# Patient Record
Sex: MALE | Race: WHITE | NOT HISPANIC OR LATINO | Employment: OTHER | URBAN - NONMETROPOLITAN AREA
[De-identification: names, ages, dates, MRNs, and addresses within clinical notes are randomized per-mention and may not be internally consistent; named-entity substitution may affect disease eponyms.]

---

## 2024-01-26 DIAGNOSIS — I10 HYPERTENSION, ESSENTIAL, BENIGN: ICD-10-CM

## 2024-01-29 PROBLEM — R60.0 LOWER EXTREMITY EDEMA: Status: ACTIVE | Noted: 2024-01-29

## 2024-01-29 PROBLEM — G25.0 ESSENTIAL TREMOR: Status: ACTIVE | Noted: 2024-01-29

## 2024-01-29 PROBLEM — R53.83 FATIGUE: Status: ACTIVE | Noted: 2024-01-29

## 2024-01-29 PROBLEM — I10 HYPERTENSION, ESSENTIAL, BENIGN: Status: ACTIVE | Noted: 2024-01-29

## 2024-01-29 PROBLEM — E78.2 MIXED HYPERLIPIDEMIA: Status: ACTIVE | Noted: 2024-01-29

## 2024-01-29 PROBLEM — I25.10 ATHEROSCLEROSIS OF NATIVE CORONARY ARTERY OF NATIVE HEART WITHOUT ANGINA PECTORIS: Status: ACTIVE | Noted: 2024-01-29

## 2024-01-29 PROBLEM — E11.9 DIABETES (MULTI): Status: ACTIVE | Noted: 2024-01-29

## 2024-01-29 PROBLEM — Z98.61 S/P PTCA (PERCUTANEOUS TRANSLUMINAL CORONARY ANGIOPLASTY): Status: ACTIVE | Noted: 2024-01-29

## 2024-01-29 PROBLEM — R94.39 ABNORMAL NUCLEAR STRESS TEST: Status: ACTIVE | Noted: 2024-01-29

## 2024-01-29 RX ORDER — MEMANTINE HYDROCHLORIDE 21 MG/1
1 CAPSULE, EXTENDED RELEASE ORAL DAILY
COMMUNITY

## 2024-01-29 RX ORDER — CLOPIDOGREL BISULFATE 75 MG/1
1 TABLET ORAL DAILY
COMMUNITY

## 2024-01-29 RX ORDER — CARBIDOPA AND LEVODOPA 10; 100 MG/1; MG/1
1 TABLET, ORALLY DISINTEGRATING ORAL 2 TIMES DAILY
COMMUNITY

## 2024-01-29 RX ORDER — ASPIRIN 81 MG/1
1 TABLET ORAL DAILY
COMMUNITY

## 2024-01-29 RX ORDER — ALBUTEROL SULFATE 90 UG/1
2 AEROSOL, METERED RESPIRATORY (INHALATION)
COMMUNITY
End: 2024-04-18 | Stop reason: ALTCHOICE

## 2024-01-29 RX ORDER — NITROGLYCERIN 0.4 MG/1
0.4 TABLET SUBLINGUAL EVERY 5 MIN PRN
COMMUNITY
End: 2024-04-18 | Stop reason: SDUPTHER

## 2024-01-29 RX ORDER — VALSARTAN 160 MG/1
1 TABLET ORAL DAILY
COMMUNITY

## 2024-01-29 RX ORDER — DICYCLOMINE HYDROCHLORIDE 10 MG/1
10 CAPSULE ORAL EVERY 6 HOURS
COMMUNITY

## 2024-01-29 RX ORDER — METOPROLOL SUCCINATE 25 MG/1
1 TABLET, EXTENDED RELEASE ORAL DAILY
COMMUNITY
Start: 2022-09-06

## 2024-01-29 RX ORDER — DARIFENACIN 15 MG/1
1 TABLET, EXTENDED RELEASE ORAL DAILY
COMMUNITY

## 2024-01-29 RX ORDER — GABAPENTIN 300 MG/1
1 CAPSULE ORAL 3 TIMES DAILY
COMMUNITY

## 2024-01-29 RX ORDER — METFORMIN HYDROCHLORIDE 850 MG/1
1 TABLET ORAL
COMMUNITY

## 2024-01-29 RX ORDER — ATORVASTATIN CALCIUM 20 MG/1
1 TABLET, FILM COATED ORAL DAILY
COMMUNITY

## 2024-01-30 RX ORDER — VALSARTAN 160 MG/1
160 TABLET ORAL DAILY
Qty: 90 TABLET | Refills: 3 | Status: SHIPPED | OUTPATIENT
Start: 2024-01-30

## 2024-03-19 ENCOUNTER — APPOINTMENT (OUTPATIENT)
Dept: CARDIOLOGY | Facility: CLINIC | Age: 84
End: 2024-03-19
Payer: MEDICARE

## 2024-04-18 ENCOUNTER — OFFICE VISIT (OUTPATIENT)
Dept: CARDIOLOGY | Facility: CLINIC | Age: 84
End: 2024-04-18
Payer: MEDICARE

## 2024-04-18 VITALS
WEIGHT: 211 LBS | DIASTOLIC BLOOD PRESSURE: 64 MMHG | HEIGHT: 69 IN | SYSTOLIC BLOOD PRESSURE: 116 MMHG | HEART RATE: 52 BPM | BODY MASS INDEX: 31.25 KG/M2

## 2024-04-18 DIAGNOSIS — Z98.61 S/P PTCA (PERCUTANEOUS TRANSLUMINAL CORONARY ANGIOPLASTY): ICD-10-CM

## 2024-04-18 DIAGNOSIS — Z78.9 NEVER SMOKED TOBACCO: ICD-10-CM

## 2024-04-18 DIAGNOSIS — E78.2 MIXED HYPERLIPIDEMIA: ICD-10-CM

## 2024-04-18 DIAGNOSIS — E66.9 CLASS 1 OBESITY WITH BODY MASS INDEX (BMI) OF 31.0 TO 31.9 IN ADULT, UNSPECIFIED OBESITY TYPE, UNSPECIFIED WHETHER SERIOUS COMORBIDITY PRESENT: ICD-10-CM

## 2024-04-18 DIAGNOSIS — E11.9 DIABETES MELLITUS TYPE II, NON INSULIN DEPENDENT (MULTI): ICD-10-CM

## 2024-04-18 DIAGNOSIS — I10 HYPERTENSION, ESSENTIAL, BENIGN: ICD-10-CM

## 2024-04-18 DIAGNOSIS — I25.10 ATHEROSCLEROSIS OF NATIVE CORONARY ARTERY OF NATIVE HEART WITHOUT ANGINA PECTORIS: Primary | ICD-10-CM

## 2024-04-18 PROBLEM — E66.811 CLASS 1 OBESITY WITH BODY MASS INDEX (BMI) OF 31.0 TO 31.9 IN ADULT: Status: ACTIVE | Noted: 2024-04-18

## 2024-04-18 PROCEDURE — 1159F MED LIST DOCD IN RCRD: CPT | Performed by: INTERNAL MEDICINE

## 2024-04-18 PROCEDURE — 1036F TOBACCO NON-USER: CPT | Performed by: INTERNAL MEDICINE

## 2024-04-18 PROCEDURE — 3074F SYST BP LT 130 MM HG: CPT | Performed by: INTERNAL MEDICINE

## 2024-04-18 PROCEDURE — 3078F DIAST BP <80 MM HG: CPT | Performed by: INTERNAL MEDICINE

## 2024-04-18 PROCEDURE — 99214 OFFICE O/P EST MOD 30 MIN: CPT | Performed by: INTERNAL MEDICINE

## 2024-04-18 RX ORDER — GLIMEPIRIDE 1 MG/1
1 TABLET ORAL DAILY
COMMUNITY
Start: 2023-10-20

## 2024-04-18 RX ORDER — POTASSIUM CHLORIDE 750 MG/1
TABLET, FILM COATED, EXTENDED RELEASE ORAL
COMMUNITY
Start: 2023-10-20

## 2024-04-18 RX ORDER — FUROSEMIDE 20 MG/1
20 TABLET ORAL
COMMUNITY
Start: 2023-11-27 | End: 2024-11-26

## 2024-04-18 RX ORDER — NITROGLYCERIN 0.4 MG/1
0.4 TABLET SUBLINGUAL EVERY 5 MIN PRN
Qty: 25 TABLET | Refills: 11 | Status: SHIPPED | OUTPATIENT
Start: 2024-04-18 | End: 2025-04-18

## 2024-04-18 RX ORDER — FAMOTIDINE 40 MG/1
1 TABLET, FILM COATED ORAL
COMMUNITY
Start: 2023-10-20

## 2024-04-18 RX ORDER — TROSPIUM CHLORIDE 20 MG/1
20 TABLET, FILM COATED ORAL 2 TIMES DAILY
COMMUNITY

## 2024-04-18 ASSESSMENT — ENCOUNTER SYMPTOMS
LIGHT-HEADEDNESS: 1
DIZZINESS: 1
HEADACHES: 1
SHORTNESS OF BREATH: 1

## 2024-04-18 NOTE — PROGRESS NOTES
"Fifi Diaz is a 83 y.o. male       Chief Complaint    Follow-up          HPI   Patient is in the office for follow-up accompanied by his daughter.  His wife passed away back in September 2023.  Since his last visit he had an echocardiogram which came back unremarkable.  He denies any angina and has no orthopnea PND or lower extremity edema and no claudications palpitations and no falls.  He has not had any blood work since he was last seen.  His cardiac and pulm examinations were normal.  His medical therapy was reviewed with him and he seem to tolerate his medical therapy without any side effects.    ASSESSMENT AND PLAN:      1. Coronary artery disease, status post single-vessel angioplasty of the LAD in 2017 in December with drug-eluting stent. Cardiac catheterization June 2020 revealed widely patent LAD stent with no other disease noted. He will remain on dual antiplatelet therapy for the time being.   2. Hyperlipidemia, on statin therapy, lipid profile is ordered  3. Hypertension, currently controlled.   4.  Class I obesity, more encouragement for weight loss with the proper measures to be taken was provided to patient.  5. Essential tremor. That is stable. He follows wit neurology  6. Diabetes on metformin, patient follows with PCP. I have no recent A1c readings  7.  Echocardiogram 2022 demonstrated normal ejection fraction with no significant valvular heart disease     Mauri Argueta MD, FACC   Review of Systems   Constitutional: Positive for malaise/fatigue.   Respiratory:  Positive for shortness of breath.    Neurological:  Positive for dizziness, headaches and light-headedness.   All other systems reviewed and are negative.           Vitals:    04/18/24 1127   BP: 116/64   BP Location: Left arm   Patient Position: Sitting   Pulse: 52   Weight: 95.7 kg (211 lb)   Height: 1.753 m (5' 9\")        Objective   Physical Exam  Constitutional:       Appearance: Normal appearance.   HENT:      Nose: " Nose normal.   Neck:      Vascular: No carotid bruit.   Cardiovascular:      Rate and Rhythm: Normal rate.      Pulses: Normal pulses.      Heart sounds: Normal heart sounds.   Pulmonary:      Effort: Pulmonary effort is normal.   Abdominal:      General: Bowel sounds are normal.      Palpations: Abdomen is soft.   Musculoskeletal:         General: Normal range of motion.      Cervical back: Normal range of motion.      Right lower leg: No edema.      Left lower leg: No edema.   Skin:     General: Skin is warm and dry.   Neurological:      General: No focal deficit present.      Mental Status: He is alert.   Psychiatric:         Mood and Affect: Mood normal.         Behavior: Behavior normal.         Thought Content: Thought content normal.         Judgment: Judgment normal.         Allergies  Penicillins     Current Medications    Current Outpatient Medications:     aspirin 81 mg EC tablet, Take 1 tablet (81 mg) by mouth once daily., Disp: , Rfl:     atorvastatin (Lipitor) 20 mg tablet, Take 1 tablet (20 mg) by mouth once daily., Disp: , Rfl:     carbidopa-levodopa (Parcopa)  mg disintegrating tablet, Take 1 tablet by mouth 2 times a day., Disp: , Rfl:     clopidogrel (Plavix) 75 mg tablet, Take 1 tablet (75 mg) by mouth once daily., Disp: , Rfl:     famotidine (Pepcid) 40 mg tablet, Take 1 tablet (40 mg) by mouth once daily in the morning. Take before meals., Disp: , Rfl:     furosemide (Lasix) 20 mg tablet, Take 1 tablet (20 mg) by mouth once daily., Disp: , Rfl:     glimepiride (Amaryl) 1 mg tablet, 1 tablet (1 mg) once daily., Disp: , Rfl:     memantine (Namenda XR) 21 mg capsule,sprinkle,ER 24hr, Take 1 capsule (21 mg) by mouth once daily., Disp: , Rfl:     metFORMIN (Glucophage) 850 mg tablet, Take 1 tablet (850 mg) by mouth 2 times a day with meals., Disp: , Rfl:     metoprolol succinate XL (Toprol-XL) 25 mg 24 hr tablet, Take 1 tablet (25 mg) by mouth once daily., Disp: , Rfl:     potassium chloride  CR 10 mEq ER tablet, TAKE 1 TABLET BY MOUTH TWICE A DAY WITH FOOD FOR 90 DAYS, Disp: , Rfl:     trospium (Sanctura) 20 mg tablet, Take 1 tablet (20 mg) by mouth twice a day., Disp: , Rfl:     valsartan (Diovan) 160 mg tablet, TAKE 1 TABLET BY MOUTH EVERY DAY (Patient taking differently: Take 0.5 tablets (80 mg) by mouth once daily.), Disp: 90 tablet, Rfl: 3    valsartan (Diovan) 160 mg tablet, Take 1 tablet (160 mg) by mouth once daily., Disp: , Rfl:     darifenacin (Enablex) 15 mg 24 hr tablet, Take 1 tablet (15 mg) by mouth once daily., Disp: , Rfl:     dicyclomine (Bentyl) 10 mg capsule, Take 1 capsule (10 mg) by mouth every 6 hours., Disp: , Rfl:     gabapentin (Neurontin) 300 mg capsule, Take 1 capsule (300 mg) by mouth 3 times a day., Disp: , Rfl:     nitroglycerin (Nitrostat) 0.4 mg SL tablet, Place 1 tablet (0.4 mg) under the tongue every 5 minutes if needed for chest pain., Disp: 25 tablet, Rfl: 11                     Assessment/Plan   1. Atherosclerosis of native coronary artery of native heart without angina pectoris  Follow Up In Cardiology    Lipid Panel    Alanine Aminotransferase    Aspartate Aminotransferase    Basic Metabolic Panel    CBC    nitroglycerin (Nitrostat) 0.4 mg SL tablet    Lipid Panel    Alanine Aminotransferase    Aspartate Aminotransferase    Basic Metabolic Panel    CBC      2. S/P PTCA (percutaneous transluminal coronary angioplasty)        3. Hypertension, essential, benign  Basic Metabolic Panel    Basic Metabolic Panel      4. Mixed hyperlipidemia  Lipid Panel    Alanine Aminotransferase    Aspartate Aminotransferase    Lipid Panel    Alanine Aminotransferase    Aspartate Aminotransferase      5. Class 1 obesity with body mass index (BMI) of 31.0 to 31.9 in adult, unspecified obesity type, unspecified whether serious comorbidity present        6. Never smoked tobacco        7. Diabetes mellitus type II, non insulin dependent (Multi)                 Scribe Attestation  By signing  my name below, I, Carol BARR LPN, Scribe   attest that this documentation has been prepared under the direction and in the presence of Mauri Argueta MD.     Provider Attestation - Scribe documentation    All medical record entries made by the Scribe were at my direction and personally dictated by me. I have reviewed the chart and agree that the record accurately reflects my personal performance of the history, physical exam, discussion and plan.

## 2024-04-18 NOTE — PATIENT INSTRUCTIONS
Please bring all medicines, vitamins, and herbal supplements with you when you come to the office.    Prescriptions will not be filled unless you are compliant with your follow up appointments or have a follow up appointment scheduled as per instruction of your physician. Refills should be requested at the time of your visit.     BMI was above normal measurement. Current weight: 95.7 kg (211 lb)  Weight change since last visit (-) denotes wt loss -13 lbs   Weight loss needed to achieve BMI 25: 42.1 Lbs  Weight loss needed to achieve BMI 30: 8.3 Lbs  Provided instructions on dietary changes.      Follow up  Lab work

## 2024-04-18 NOTE — LETTER
April 18, 2024     Serafin Gary MD  [address]  [facility] [address]    Patient: LILLIAM Diaz   YOB: 1940   Date of Visit: 4/18/2024       Dear Dr. Serafin Gary MD:    Thank you for referring LILLIAM Diaz to me for evaluation. Below are my notes for this consultation.  If you have questions, please do not hesitate to call me. I look forward to following your patient along with you.       Sincerely,     Mauri Argueta MD      CC: No Recipients  ______________________________________________________________________________________    Subjective   LILLIAM Diaz is a 83 y.o. male       Chief Complaint    Follow-up          HPI   Patient is in the office for follow-up accompanied by his daughter.  His wife passed away back in September 2023.  Since his last visit he had an echocardiogram which came back unremarkable.  He denies any angina and has no orthopnea PND or lower extremity edema and no claudications palpitations and no falls.  He has not had any blood work since he was last seen.  His cardiac and pulm examinations were normal.  His medical therapy was reviewed with him and he seem to tolerate his medical therapy without any side effects.    ASSESSMENT AND PLAN:      1. Coronary artery disease, status post single-vessel angioplasty of the LAD in 2017 in December with drug-eluting stent. Cardiac catheterization June 2020 revealed widely patent LAD stent with no other disease noted. He will remain on dual antiplatelet therapy for the time being.   2. Hyperlipidemia, on statin therapy, lipid profile is ordered  3. Hypertension, currently controlled.   4.  Class I obesity, more encouragement for weight loss with the proper measures to be taken was provided to patient.  5. Essential tremor. That is stable. He follows wit neurology  6. Diabetes on metformin, patient follows with PCP. I have no recent A1c readings  7.  Echocardiogram 2022 demonstrated normal ejection fraction  "with no significant valvular heart disease     Mauri Argueta MD, [facility]   Review of Systems   Constitutional: Positive for malaise/fatigue.   Respiratory:  Positive for shortness of breath.    Neurological:  Positive for dizziness, headaches and light-headedness.   All other systems reviewed and are negative.           Vitals:    04/18/24 1127   BP: 116/64   BP Location: Left arm   Patient Position: Sitting   Pulse: 52   Weight: 95.7 kg (211 lb)   Height: 1.753 m (5' 9\")        Objective   Physical Exam  Constitutional:       Appearance: Normal appearance.   HENT:      Nose: Nose normal.   Neck:      Vascular: No carotid bruit.   Cardiovascular:      Rate and Rhythm: Normal rate.      Pulses: Normal pulses.      Heart sounds: Normal heart sounds.   Pulmonary:      Effort: Pulmonary effort is normal.   Abdominal:      General: Bowel sounds are normal.      Palpations: Abdomen is soft.   Musculoskeletal:         General: Normal range of motion.      Cervical back: Normal range of motion.      Right lower leg: No edema.      Left lower leg: No edema.   Skin:     General: Skin is warm and dry.   Neurological:      General: No focal deficit present.      Mental Status: He is alert.   Psychiatric:         Mood and Affect: Mood normal.         Behavior: Behavior normal.         Thought Content: Thought content normal.         Judgment: Judgment normal.         Allergies  Penicillins     Current Medications    Current Outpatient Medications:   •  aspirin 81 mg EC tablet, Take 1 tablet (81 mg) by mouth once daily., Disp: , Rfl:   •  atorvastatin (Lipitor) 20 mg tablet, Take 1 tablet (20 mg) by mouth once daily., Disp: , Rfl:   •  carbidopa-levodopa (Parcopa)  mg disintegrating tablet, Take 1 tablet by mouth 2 times a day., Disp: , Rfl:   •  clopidogrel (Plavix) 75 mg tablet, Take 1 tablet (75 mg) by mouth once daily., Disp: , Rfl:   •  famotidine (Pepcid) 40 mg tablet, Take 1 tablet (40 mg) by mouth once daily in " the morning. Take before meals., Disp: , Rfl:   •  furosemide (Lasix) 20 mg tablet, Take 1 tablet (20 mg) by mouth once daily., Disp: , Rfl:   •  glimepiride (Amaryl) 1 mg tablet, 1 tablet (1 mg) once daily., Disp: , Rfl:   •  memantine (Namenda XR) 21 mg capsule,sprinkle,ER 24hr, Take 1 capsule (21 mg) by mouth once daily., Disp: , Rfl:   •  metFORMIN (Glucophage) 850 mg tablet, Take 1 tablet (850 mg) by mouth 2 times a day with meals., Disp: , Rfl:   •  metoprolol succinate XL (Toprol-XL) 25 mg 24 hr tablet, Take 1 tablet (25 mg) by mouth once daily., Disp: , Rfl:   •  potassium chloride CR 10 mEq ER tablet, TAKE 1 TABLET BY MOUTH TWICE A DAY WITH FOOD FOR 90 DAYS, Disp: , Rfl:   •  trospium (Sanctura) 20 mg tablet, Take 1 tablet (20 mg) by mouth twice a day., Disp: , Rfl:   •  valsartan (Diovan) 160 mg tablet, TAKE 1 TABLET BY MOUTH EVERY DAY (Patient taking differently: Take 0.5 tablets (80 mg) by mouth once daily.), Disp: 90 tablet, Rfl: 3  •  valsartan (Diovan) 160 mg tablet, Take 1 tablet (160 mg) by mouth once daily., Disp: , Rfl:   •  darifenacin (Enablex) 15 mg 24 hr tablet, Take 1 tablet (15 mg) by mouth once daily., Disp: , Rfl:   •  dicyclomine (Bentyl) 10 mg capsule, Take 1 capsule (10 mg) by mouth every 6 hours., Disp: , Rfl:   •  gabapentin (Neurontin) 300 mg capsule, Take 1 capsule (300 mg) by mouth 3 times a day., Disp: , Rfl:   •  nitroglycerin (Nitrostat) 0.4 mg SL tablet, Place 1 tablet (0.4 mg) under the tongue every 5 minutes if needed for chest pain., Disp: 25 tablet, Rfl: 11                     Assessment/Plan   1. Atherosclerosis of native coronary artery of native heart without angina pectoris  Follow Up In Cardiology    Lipid Panel    Alanine Aminotransferase    Aspartate Aminotransferase    Basic Metabolic Panel    CBC    nitroglycerin (Nitrostat) 0.4 mg SL tablet    Lipid Panel    Alanine Aminotransferase    Aspartate Aminotransferase    Basic Metabolic Panel    CBC      2. S/P PTCA  (percutaneous transluminal coronary angioplasty)        3. Hypertension, essential, benign  Basic Metabolic Panel    Basic Metabolic Panel      4. Mixed hyperlipidemia  Lipid Panel    Alanine Aminotransferase    Aspartate Aminotransferase    Lipid Panel    Alanine Aminotransferase    Aspartate Aminotransferase      5. Class 1 obesity with body mass index (BMI) of 31.0 to 31.9 in adult, unspecified obesity type, unspecified whether serious comorbidity present        6. Never smoked tobacco        7. Diabetes mellitus type II, non insulin dependent (Multi)                 Scribe Attestation  By signing my name below, Carol VALDERRAMA LPN, Scribe   attest that this documentation has been prepared under the direction and in the presence of Mauri Argueta MD.     Provider Attestation - Scribe documentation    All medical record entries made by the Scribe were at my direction and personally dictated by me. I have reviewed the chart and agree that the record accurately reflects my personal performance of the history, physical exam, discussion and plan.

## 2024-04-19 ENCOUNTER — APPOINTMENT (OUTPATIENT)
Dept: CARDIOLOGY | Facility: CLINIC | Age: 84
End: 2024-04-19
Payer: MEDICARE

## 2024-04-22 LAB
NON-UH HIE ALANINE AMINOTRANSFERASE:CCNC:PT:SER/PLAS:QN:NO ADDITION OF P-5': 5 INT._UNIT/L (ref 6–46)
NON-UH HIE ANION GAP:SCNC:PT:SER/PLAS:QN:: 10 MEQ/L (ref 6–16)
NON-UH HIE ASPARTATE AMINOTRANSFERASE:CCNC:PT:SER/PLAS:QN:: 15 INT._UNIT/L (ref 5–43)
NON-UH HIE CALCIUM:MCNC:PT:SER/PLAS:QN:: 8.9 MG/DL (ref 8.9–11.1)
NON-UH HIE CARBON DIOXIDE:SCNC:PT:SER/PLAS:QN:: 29 MMOL/L (ref 21–31)
NON-UH HIE CHLORIDE:SCNC:PT:SER/PLAS:QN:: 109 MMOL/L (ref 101–111)
NON-UH HIE CHOLESTEROL.IN HDL:MCNC:PT:SER/PLAS:QN:: 44 MG/DL
NON-UH HIE CHOLESTEROL.IN LDL:MCNC:PT:SER/PLAS:QN:: 52 MG/DL
NON-UH HIE CHOLESTEROL.IN VLDL:MCNC:PT:SER/PLAS:QN:CALCULATED: 16 MG/DL (ref 7–40)
NON-UH HIE CHOLESTEROL:MCNC:PT:SER/PLAS:QN:: 112 MG/DL (ref 120–200)
NON-UH HIE CREATININE:MCNC:PT:SER/PLAS:QN:: 1.5 MG/DL (ref 0.5–1.3)
NON-UH HIE EGFR: 45 ML/MIN/1.73 M2
NON-UH HIE ERYTHROCYTE DISTRIBUTION WIDTH:RATIO:PT:RBC:QN:AUTOMATED COUNT: 14.2 % (ref 10.9–14.2)
NON-UH HIE ERYTHROCYTE MEAN CORPUSCULAR HEMOGLOBIN CONCENTRATION:MCNC:PT:RB: 32.7 GM/DL (ref 31.4–36)
NON-UH HIE ERYTHROCYTE MEAN CORPUSCULAR HEMOGLOBIN:ENTMASS:PT:RBC:QN:AUTOMA: 30.6 PG (ref 27–34)
NON-UH HIE ERYTHROCYTE MEAN CORPUSCULAR VOLUME:ENTVOL:PT:RBC:QN:AUTOMATED C: 93.7 FL (ref 80–100)
NON-UH HIE ERYTHROCYTE SIZE:MORPH:PT:BLD:NOM:: NORMAL
NON-UH HIE ERYTHROCYTES:NCNC:PT:BLD:QN:AUTOMATED COUNT: 4.5 E12/L (ref 4.3–5.9)
NON-UH HIE GLUCOSE:MCNC:PT:SER/PLAS:QN:: 102 MG/DL (ref 55–199)
NON-UH HIE HEMATOCRIT:VFR:PT:BLD:QN:AUTOMATED COUNT: 41.7 % (ref 37.7–49)
NON-UH HIE HEMOGLOBIN:MCNC:PT:BLD:QN:: 13.6 GM/DL (ref 13.5–17.5)
NON-UH HIE LEUKOCYTES: 8.2 E9/L (ref 4–11)
NON-UH HIE PLATELET MEAN VOLUME:ENTVOL:PT:BLD:QN:AUTOMATED COUNT: 8.7 FL (ref 6.4–10.8)
NON-UH HIE PLATELET: 204 E9/L (ref 150–500)
NON-UH HIE POTASSIUM:SCNC:PT:SER/PLAS:QN:: 4.4 MMOL/L (ref 3.5–5.3)
NON-UH HIE SODIUM:SCNC:PT:SER/PLAS:QN:: 144 MMOL/L (ref 135–145)
NON-UH HIE TRIGLYCERIDE:MCNC:PT:SER/PLAS:QN:: 80 MG/DL
NON-UH HIE UREA NITROGEN/CREATININE:MRTO:PT:SER/PLAS:QN:: 16 NO UNITS (ref 10–20)
NON-UH HIE UREA NITROGEN:MCNC:PT:SER/PLAS:QN:: 24 MG/DL (ref 5–21)

## 2024-10-29 ENCOUNTER — APPOINTMENT (OUTPATIENT)
Dept: CARDIOLOGY | Facility: CLINIC | Age: 84
End: 2024-10-29
Payer: MEDICARE

## 2024-10-29 VITALS
HEART RATE: 60 BPM | SYSTOLIC BLOOD PRESSURE: 114 MMHG | HEIGHT: 71 IN | WEIGHT: 195 LBS | BODY MASS INDEX: 27.3 KG/M2 | DIASTOLIC BLOOD PRESSURE: 56 MMHG

## 2024-10-29 DIAGNOSIS — I25.10 ATHEROSCLEROSIS OF NATIVE CORONARY ARTERY OF NATIVE HEART WITHOUT ANGINA PECTORIS: Primary | ICD-10-CM

## 2024-10-29 DIAGNOSIS — G20.A1 PARKINSON'S DISEASE WITHOUT FLUCTUATING MANIFESTATIONS, UNSPECIFIED WHETHER DYSKINESIA PRESENT: ICD-10-CM

## 2024-10-29 DIAGNOSIS — E78.2 MIXED HYPERLIPIDEMIA: ICD-10-CM

## 2024-10-29 DIAGNOSIS — Z78.9 NEVER SMOKED TOBACCO: ICD-10-CM

## 2024-10-29 DIAGNOSIS — I10 HYPERTENSION, ESSENTIAL, BENIGN: ICD-10-CM

## 2024-10-29 DIAGNOSIS — Z98.61 S/P PTCA (PERCUTANEOUS TRANSLUMINAL CORONARY ANGIOPLASTY): ICD-10-CM

## 2024-10-29 DIAGNOSIS — N18.32 STAGE 3B CHRONIC KIDNEY DISEASE (MULTI): ICD-10-CM

## 2024-10-29 DIAGNOSIS — E11.21 TYPE 2 DIABETES MELLITUS WITH DIABETIC NEPHROPATHY, UNSPECIFIED WHETHER LONG TERM INSULIN USE: ICD-10-CM

## 2024-10-29 PROCEDURE — 1159F MED LIST DOCD IN RCRD: CPT | Performed by: INTERNAL MEDICINE

## 2024-10-29 PROCEDURE — 3074F SYST BP LT 130 MM HG: CPT | Performed by: INTERNAL MEDICINE

## 2024-10-29 PROCEDURE — 99214 OFFICE O/P EST MOD 30 MIN: CPT | Performed by: INTERNAL MEDICINE

## 2024-10-29 PROCEDURE — 3078F DIAST BP <80 MM HG: CPT | Performed by: INTERNAL MEDICINE

## 2024-10-29 RX ORDER — VALSARTAN 80 MG/1
80 TABLET ORAL DAILY
COMMUNITY

## 2024-10-29 ASSESSMENT — ENCOUNTER SYMPTOMS
DYSPNEA ON EXERTION: 1
DIZZINESS: 1

## 2025-07-10 ENCOUNTER — APPOINTMENT (OUTPATIENT)
Dept: CARDIOLOGY | Facility: CLINIC | Age: 85
End: 2025-07-10
Payer: MEDICARE

## 2025-07-10 VITALS
HEART RATE: 52 BPM | HEIGHT: 71 IN | BODY MASS INDEX: 26.6 KG/M2 | SYSTOLIC BLOOD PRESSURE: 110 MMHG | DIASTOLIC BLOOD PRESSURE: 60 MMHG | WEIGHT: 190 LBS

## 2025-07-10 DIAGNOSIS — I10 HYPERTENSION, ESSENTIAL, BENIGN: ICD-10-CM

## 2025-07-10 DIAGNOSIS — E78.2 MIXED HYPERLIPIDEMIA: ICD-10-CM

## 2025-07-10 DIAGNOSIS — R53.83 FATIGUE, UNSPECIFIED TYPE: ICD-10-CM

## 2025-07-10 DIAGNOSIS — Z78.9 NEVER SMOKED TOBACCO: ICD-10-CM

## 2025-07-10 DIAGNOSIS — E11.21 TYPE 2 DIABETES MELLITUS WITH DIABETIC NEPHROPATHY, UNSPECIFIED WHETHER LONG TERM INSULIN USE: ICD-10-CM

## 2025-07-10 DIAGNOSIS — R06.00 DYSPNEA, UNSPECIFIED TYPE: ICD-10-CM

## 2025-07-10 DIAGNOSIS — I25.10 ATHEROSCLEROSIS OF NATIVE CORONARY ARTERY OF NATIVE HEART WITHOUT ANGINA PECTORIS: ICD-10-CM

## 2025-07-10 DIAGNOSIS — R00.1 SINUS BRADYCARDIA: Primary | ICD-10-CM

## 2025-07-10 PROCEDURE — G2211 COMPLEX E/M VISIT ADD ON: HCPCS | Performed by: INTERNAL MEDICINE

## 2025-07-10 PROCEDURE — 1159F MED LIST DOCD IN RCRD: CPT | Performed by: INTERNAL MEDICINE

## 2025-07-10 PROCEDURE — 99214 OFFICE O/P EST MOD 30 MIN: CPT | Performed by: INTERNAL MEDICINE

## 2025-07-10 PROCEDURE — 3074F SYST BP LT 130 MM HG: CPT | Performed by: INTERNAL MEDICINE

## 2025-07-10 PROCEDURE — 1036F TOBACCO NON-USER: CPT | Performed by: INTERNAL MEDICINE

## 2025-07-10 PROCEDURE — 3078F DIAST BP <80 MM HG: CPT | Performed by: INTERNAL MEDICINE

## 2025-07-10 RX ORDER — LINAGLIPTIN 5 MG/1
5 TABLET, FILM COATED ORAL DAILY
COMMUNITY

## 2025-07-10 NOTE — PROGRESS NOTES
HPI  Patient is in the office for follow-up for CAD previous PCI in 2017 to the LAD.  He has Parkinson's disease, hypertension hyperlipidemia along with diabetes.  He complains of being tired most of the time but denies any angina palpitations orthopnea PND or lower extremity edema.  He was noted to have slow heart rate in the low 50s and he has been on metoprolol 25 mg daily which will be discontinued.  His last blood work was a year ago and he is due for blood work which is scheduled.  He has no lower extremity edema his lungs were clear his card examination outside of the bradycardia was unremarkable.  His medication were reviewed and he seemed to have no trouble taking his medications.    ASSESSMENT AND PLAN:      1. Coronary artery disease, status post single-vessel angioplasty of the LAD in 2017 in December with drug-eluting stent. Cardiac catheterization June 2020 revealed widely patent LAD stent with no other disease noted. He will remain on dual antiplatelet therapy for the time being.   2. Hyperlipidemia, currently on atorvastatin 20 mg daily.  His lipids have been controlled on this dose and is due for lipid profile which is ordered.  He follows low-fat diet.  3.  Essential hypertension, currently controlled.  He follows low-salt diet.  4.  Overweight, the patient has lost significant weight since his last visit and is close to target ideal body weight.  5. Essential tremor. That is stable. He follows wit neurology, he is on sentiment indicated that probably has Parkinson's disease  6.  Type II diabetes on metformin, patient follows with PCP.  He follows low carbohydrate diet.  He tries to maintain active lifestyle.  7.  Sinus bradycardia, may be contributing to his fatigue., will discontinue metoprolol.  ROS   Generalized fatigue      Vitals:    07/10/25 0937 07/10/25 1009   BP: 94/62 110/60   BP Location: Left arm    Patient Position: Sitting    Pulse: 52    Weight: 86.2 kg (190 lb)    Height: 1.803 m  "(5' 11\")         Objective   Physical Exam  Constitutional:       Appearance: Normal appearance.   HENT:      Nose: Nose normal.   Neck:      Vascular: No carotid bruit.   Cardiovascular:      Rate and Rhythm: Normal rate.      Pulses: Normal pulses.      Heart sounds: Normal heart sounds.   Pulmonary:      Effort: Pulmonary effort is normal.   Abdominal:      General: Bowel sounds are normal.      Palpations: Abdomen is soft.   Musculoskeletal:         General: Normal range of motion.      Cervical back: Normal range of motion.      Right lower leg: No edema.      Left lower leg: No edema.   Skin:     General: Skin is warm and dry.   Neurological:      General: No focal deficit present.      Mental Status: He is alert.   Psychiatric:         Mood and Affect: Mood normal.         Behavior: Behavior normal.         Thought Content: Thought content normal.         Judgment: Judgment normal.         Allergies  Penicillins     Current Medications  Current Outpatient Medications   Medication Instructions    aspirin 81 mg EC tablet 1 tablet, Daily    atorvastatin (Lipitor) 20 mg tablet 1 tablet, Daily    carbidopa-levodopa (Parcopa)  mg disintegrating tablet 1 tablet, 2 times daily    clopidogrel (Plavix) 75 mg tablet 1 tablet, Daily    famotidine (Pepcid) 40 mg tablet 1 tablet, Daily before breakfast    furosemide (LASIX) 20 mg, Daily RT    glimepiride (AMARYL) 1 mg, Daily    memantine (Namenda XR) 21 mg capsule,sprinkle,ER 24hr 1 capsule, Daily    metFORMIN (Glucophage) 850 mg tablet 1 tablet, 2 times daily (morning and late afternoon)    nitroglycerin (NITROSTAT) 0.4 mg, sublingual, Every 5 min PRN    potassium chloride CR 10 mEq ER tablet TAKE 1 TABLET BY MOUTH TWICE A DAY WITH FOOD FOR 90 DAYS    Tradjenta 5 mg, Daily    trospium (SANCTURA) 20 mg, 2 times daily                        Assessment/Plan   1. Atherosclerosis of native coronary artery of native heart without angina pectoris  Follow Up In Cardiology "    Alanine Aminotransferase    Basic Metabolic Panel    CBC    Lipid Panel    Follow Up In Cardiology    Aspartate Aminotransferase    Alanine Aminotransferase    Basic Metabolic Panel    CBC    Lipid Panel    Aspartate Aminotransferase      2. Hypertension, essential, benign  Basic Metabolic Panel    Basic Metabolic Panel      3. Mixed hyperlipidemia  Alanine Aminotransferase    Lipid Panel    Aspartate Aminotransferase    Alanine Aminotransferase    Lipid Panel    Aspartate Aminotransferase      4. Type 2 diabetes mellitus with diabetic nephropathy, unspecified whether long term insulin use        5. Fatigue, unspecified type  CBC    CBC      6. Dyspnea, unspecified type  CBC    CBC      7. BMI 26.0-26.9,adult        8. Never smoked tobacco                 Scribe Attestation  By signing my name below, ICarol LPN   , Aicha   attest that this documentation has been prepared under the direction and in the presence of Mauri Argueta MD.     Provider Attestation - Scribe documentation    All medical record entries made by the Scribe were at my direction and personally dictated by me. I have reviewed the chart and agree that the record accurately reflects my personal performance of the history, physical exam, discussion and plan.

## 2025-07-10 NOTE — PATIENT INSTRUCTIONS
Please bring all medicines, vitamins, and herbal supplements with you when you come to the office.    Prescriptions will not be filled unless you are compliant with your follow up appointments or have a follow up appointment scheduled as per instruction of your physician. Refills should be requested at the time of your visit.     BMI was above normal measurement. Current weight: 86.2 kg (190 lb)  Weight change since last visit (-) denotes wt loss -5 lbs   Weight loss needed to achieve BMI 25: 11.1 Lbs  Weight loss needed to achieve BMI 30: -24.6 Lbs  Provided instructions on dietary changes.    Stop Toprol/Metoprolol  Lab work  Follow up 8 months

## 2025-07-10 NOTE — LETTER
July 10, 2025     Serafin Gary MD  [address]  [facility] [address]    Patient: LILLIAM Diaz   YOB: 1940   Date of Visit: 7/10/2025       Dear Dr. Serafin Gary MD:    Thank you for referring LILLIAM Diaz to me for evaluation. Below are my notes for this consultation.  If you have questions, please do not hesitate to call me. I look forward to following your patient along with you.       Sincerely,     Mauri Argueta MD      CC: No Recipients  ______________________________________________________________________________________    HPI  Patient is in the office for follow-up for CAD previous PCI in 2017 to the LAD.  He has Parkinson's disease, hypertension hyperlipidemia along with diabetes.  He complains of being tired most of the time but denies any angina palpitations orthopnea PND or lower extremity edema.  He was noted to have slow heart rate in the low 50s and he has been on metoprolol 25 mg daily which will be discontinued.  His last blood work was a year ago and he is due for blood work which is scheduled.  He has no lower extremity edema his lungs were clear his card examination outside of the bradycardia was unremarkable.  His medication were reviewed and he seemed to have no trouble taking his medications.    ASSESSMENT AND PLAN:      1. Coronary artery disease, status post single-vessel angioplasty of the LAD in 2017 in December with drug-eluting stent. Cardiac catheterization June 2020 revealed widely patent LAD stent with no other disease noted. He will remain on dual antiplatelet therapy for the time being.   2. Hyperlipidemia, currently on atorvastatin 20 mg daily.  His lipids have been controlled on this dose and is due for lipid profile which is ordered.  He follows low-fat diet.  3.  Essential hypertension, currently controlled.  He follows low-salt diet.  4.  Overweight, the patient has lost significant weight since his last visit and is close to  "target ideal body weight.  5. Essential tremor. That is stable. He follows wit neurology, he is on sentiment indicated that probably has Parkinson's disease  6.  Type II diabetes on metformin, patient follows with PCP.  He follows low carbohydrate diet.  He tries to maintain active lifestyle.  7.  Sinus bradycardia, may be contributing to his fatigue., will discontinue metoprolol.  ROS   Generalized fatigue      Vitals:    07/10/25 0937 07/10/25 1009   BP: 94/62 110/60   BP Location: Left arm    Patient Position: Sitting    Pulse: 52    Weight: 86.2 kg (190 lb)    Height: 1.803 m (5' 11\")         Objective   Physical Exam  Constitutional:       Appearance: Normal appearance.   HENT:      Nose: Nose normal.   Neck:      Vascular: No carotid bruit.   Cardiovascular:      Rate and Rhythm: Normal rate.      Pulses: Normal pulses.      Heart sounds: Normal heart sounds.   Pulmonary:      Effort: Pulmonary effort is normal.   Abdominal:      General: Bowel sounds are normal.      Palpations: Abdomen is soft.   Musculoskeletal:         General: Normal range of motion.      Cervical back: Normal range of motion.      Right lower leg: No edema.      Left lower leg: No edema.   Skin:     General: Skin is warm and dry.   Neurological:      General: No focal deficit present.      Mental Status: He is alert.   Psychiatric:         Mood and Affect: Mood normal.         Behavior: Behavior normal.         Thought Content: Thought content normal.         Judgment: Judgment normal.         Allergies  Penicillins     Current Medications  Current Outpatient Medications   Medication Instructions   • aspirin 81 mg EC tablet 1 tablet, Daily   • atorvastatin (Lipitor) 20 mg tablet 1 tablet, Daily   • carbidopa-levodopa (Parcopa)  mg disintegrating tablet 1 tablet, 2 times daily   • clopidogrel (Plavix) 75 mg tablet 1 tablet, Daily   • famotidine (Pepcid) 40 mg tablet 1 tablet, Daily before breakfast   • furosemide (LASIX) 20 mg, " Daily RT   • glimepiride (AMARYL) 1 mg, Daily   • memantine (Namenda XR) 21 mg capsule,sprinkle,ER 24hr 1 capsule, Daily   • metFORMIN (Glucophage) 850 mg tablet 1 tablet, 2 times daily (morning and late afternoon)   • nitroglycerin (NITROSTAT) 0.4 mg, sublingual, Every 5 min PRN   • potassium chloride CR 10 mEq ER tablet TAKE 1 TABLET BY MOUTH TWICE A DAY WITH FOOD FOR 90 DAYS   • Tradjenta 5 mg, Daily   • trospium (SANCTURA) 20 mg, 2 times daily                        Assessment/Plan   1. Atherosclerosis of native coronary artery of native heart without angina pectoris  Follow Up In Cardiology    Alanine Aminotransferase    Basic Metabolic Panel    CBC    Lipid Panel    Follow Up In Cardiology    Aspartate Aminotransferase    Alanine Aminotransferase    Basic Metabolic Panel    CBC    Lipid Panel    Aspartate Aminotransferase      2. Hypertension, essential, benign  Basic Metabolic Panel    Basic Metabolic Panel      3. Mixed hyperlipidemia  Alanine Aminotransferase    Lipid Panel    Aspartate Aminotransferase    Alanine Aminotransferase    Lipid Panel    Aspartate Aminotransferase      4. Type 2 diabetes mellitus with diabetic nephropathy, unspecified whether long term insulin use        5. Fatigue, unspecified type  CBC    CBC      6. Dyspnea, unspecified type  CBC    CBC      7. BMI 26.0-26.9,adult        8. Never smoked tobacco                 Scribe Attestation  By signing my name below, ICarol LPN, Scribe   attest that this documentation has been prepared under the direction and in the presence of Mauri Argueta MD.     Provider Attestation - Scribe documentation    All medical record entries made by the Scribe were at my direction and personally dictated by me. I have reviewed the chart and agree that the record accurately reflects my personal performance of the history, physical exam, discussion and plan.

## 2025-07-19 LAB
NON-UH HIE AGAP: 12 MEQ/L (ref 6–16)
NON-UH HIE ALT: 14 INT._UNIT/L (ref 6–46)
NON-UH HIE AST: 17 INT._UNIT/L (ref 5–43)
NON-UH HIE BUN/CREAT RATIO: 17 NO UNITS (ref 10–20)
NON-UH HIE BUN: 29 MG/DL (ref 5–21)
NON-UH HIE CALCIUM LVL: 9 MG/DL (ref 8.9–11.1)
NON-UH HIE CHLORIDE: 105 MMOL/L (ref 101–111)
NON-UH HIE CHOL: 129 MG/DL (ref 120–200)
NON-UH HIE CO2: 31 MMOL/L (ref 21–31)
NON-UH HIE CREATININE: 1.7 MG/DL (ref 0.5–1.3)
NON-UH HIE EGFR: 39 ML/MIN/1.73 M2
NON-UH HIE GLUCOSE LVL: 122 MG/DL (ref 55–199)
NON-UH HIE HCT: 41.7 % (ref 37.7–49)
NON-UH HIE HDL: 49 MG/DL
NON-UH HIE HGB: 13.9 GM/DL (ref 13.5–17.5)
NON-UH HIE LDL DIRECT: 60 MG/DL
NON-UH HIE MCH: 30.9 PG (ref 27–34)
NON-UH HIE MCHC: 33.4 GM/DL (ref 31.4–36)
NON-UH HIE MCV: 92.5 FL (ref 80–100)
NON-UH HIE MPV: 9.4 FL (ref 6.4–10.8)
NON-UH HIE PLATELET: 193 E9/L (ref 150–500)
NON-UH HIE POTASSIUM LVL: 4.1 MMOL/L (ref 3.5–5.3)
NON-UH HIE RBC MORPH: NORMAL
NON-UH HIE RBC: 4.5 E12/L (ref 4.3–5.9)
NON-UH HIE RDW: 14.5 % (ref 10.9–14.2)
NON-UH HIE SODIUM LVL: 144 MMOL/L (ref 135–145)
NON-UH HIE TRIG: 77 MG/DL
NON-UH HIE VLDL: 15 MG/DL (ref 7–40)
NON-UH HIE WBC: 7.1 E9/L (ref 4–11)

## 2026-03-17 ENCOUNTER — APPOINTMENT (OUTPATIENT)
Dept: CARDIOLOGY | Facility: CLINIC | Age: 86
End: 2026-03-17
Payer: MEDICARE